# Patient Record
Sex: MALE
[De-identification: names, ages, dates, MRNs, and addresses within clinical notes are randomized per-mention and may not be internally consistent; named-entity substitution may affect disease eponyms.]

---

## 2018-07-31 ENCOUNTER — HOSPITAL ENCOUNTER (EMERGENCY)
Dept: HOSPITAL 31 - C.ER | Age: 23
Discharge: HOME | End: 2018-07-31
Payer: COMMERCIAL

## 2018-07-31 VITALS
RESPIRATION RATE: 20 BRPM | DIASTOLIC BLOOD PRESSURE: 71 MMHG | TEMPERATURE: 98.5 F | HEART RATE: 62 BPM | SYSTOLIC BLOOD PRESSURE: 116 MMHG

## 2018-07-31 VITALS — OXYGEN SATURATION: 98 %

## 2018-07-31 DIAGNOSIS — R42: Primary | ICD-10-CM

## 2018-07-31 LAB
ALBUMIN SERPL-MCNC: 4.4 G/DL (ref 3.5–5)
ALBUMIN/GLOB SERPL: 1.3 {RATIO} (ref 1–2.1)
ALT SERPL-CCNC: 38 U/L (ref 21–72)
AST SERPL-CCNC: 26 U/L (ref 17–59)
BASOPHILS # BLD AUTO: 0 K/UL (ref 0–0.2)
BASOPHILS NFR BLD: 0.8 % (ref 0–2)
BUN SERPL-MCNC: 14 MG/DL (ref 9–20)
CALCIUM SERPL-MCNC: 9.2 MG/DL (ref 8.6–10.4)
EOSINOPHIL # BLD AUTO: 0.2 K/UL (ref 0–0.7)
EOSINOPHIL NFR BLD: 4.2 % (ref 0–4)
ERYTHROCYTE [DISTWIDTH] IN BLOOD BY AUTOMATED COUNT: 14.3 % (ref 11.5–14.5)
GFR NON-AFRICAN AMERICAN: > 60
HGB BLD-MCNC: 13.6 G/DL (ref 12–18)
LYMPHOCYTES # BLD AUTO: 1.7 K/UL (ref 1–4.3)
LYMPHOCYTES NFR BLD AUTO: 31.2 % (ref 20–40)
MCH RBC QN AUTO: 27.9 PG (ref 27–31)
MCHC RBC AUTO-ENTMCNC: 33 G/DL (ref 33–37)
MCV RBC AUTO: 84.5 FL (ref 80–94)
MONOCYTES # BLD: 0.7 K/UL (ref 0–0.8)
MONOCYTES NFR BLD: 13 % (ref 0–10)
NEUTROPHILS # BLD: 2.8 K/UL (ref 1.8–7)
NEUTROPHILS NFR BLD AUTO: 50.8 % (ref 50–75)
NRBC BLD AUTO-RTO: 0 % (ref 0–2)
PLATELET # BLD: 261 K/UL (ref 130–400)
PMV BLD AUTO: 9.4 FL (ref 7.2–11.7)
RBC # BLD AUTO: 4.89 MIL/UL (ref 4.4–5.9)
WBC # BLD AUTO: 5.5 K/UL (ref 4.8–10.8)

## 2018-07-31 PROCEDURE — 80053 COMPREHEN METABOLIC PANEL: CPT

## 2018-07-31 PROCEDURE — 84443 ASSAY THYROID STIM HORMONE: CPT

## 2018-07-31 PROCEDURE — 99285 EMERGENCY DEPT VISIT HI MDM: CPT

## 2018-07-31 PROCEDURE — 70450 CT HEAD/BRAIN W/O DYE: CPT

## 2018-07-31 PROCEDURE — 96361 HYDRATE IV INFUSION ADD-ON: CPT

## 2018-07-31 PROCEDURE — 85025 COMPLETE CBC W/AUTO DIFF WBC: CPT

## 2018-07-31 PROCEDURE — 96375 TX/PRO/DX INJ NEW DRUG ADDON: CPT

## 2018-07-31 PROCEDURE — 96374 THER/PROPH/DIAG INJ IV PUSH: CPT

## 2018-07-31 NOTE — CT
Date of service: 



07/31/2018



PROCEDURE:  CT HEAD WITHOUT CONTRAST.



HISTORY:

Headache, dizziness



COMPARISON:

None available.



TECHNIQUE:

Axial computed tomography images were obtained through the head/brain 

without intravenous contrast.  



Radiation dose:



Total exam DLP = 1170.66 mGy-cm.



This CT exam was performed using one or more of the following dose 

reduction techniques: Automated exposure control, adjustment of the 

mA and/or kV according to patient size, and/or use of iterative 

reconstruction technique.



FINDINGS:



HEMORRHAGE:

No intracranial hemorrhage. 



BRAIN:

No mass effect or edema.  No atrophy or chronic microvascular 

ischemic changes.



VENTRICLES:

Unremarkable. No hydrocephalus. 



CALVARIUM:

Unremarkable.



PARANASAL SINUSES:

Unremarkable as visualized. No significant inflammatory changes.



MASTOID AIR CELLS:

Unremarkable as visualized. No inflammatory changes.



OTHER FINDINGS:

None.



IMPRESSION:

Normal CT of the Head.

## 2018-07-31 NOTE — C.PDOC
History Of Present Illness


24yo male, no past medical history, comes to ER with complaints of a frontal 

headache x 2 weeks. He reports associated nausea, vomiting and lightheadedness 

and states he has not had such episodes before. Patient denies any diarrhea, 

fever, rash, numbness, weakness, vision changes, shortness of breath or chest 

pain. 


Time Seen by Provider: 07/31/18 12:22


Chief Complaint (Nursing): Headache


History Per: Patient


History/Exam Limitations: no limitations


Onset/Duration Of Symptoms: Days, Persistent


Current Symptoms Are (Timing): Still Present


Pain Scale Rating Of: 7


Quality: "Pain"


Preceeding Symptoms: None


Recent travel outside of the [place]: No


Additional History Per: Patient





Past Medical History


Reviewed: Historical Data, Nursing Documentation, Vital Signs


Vital Signs: 


 Last Vital Signs











Temp  98.5 F   07/31/18 15:57


 


Pulse  62   07/31/18 15:57


 


Resp  20   07/31/18 15:57


 


BP  116/71   07/31/18 15:57


 


Pulse Ox  98   07/31/18 15:57














- Medical History


PMH: No Chronic Diseases


Surgical History: No Surg Hx


Family History: States: No Known Family Hx





- Social History


Hx Alcohol Use: Yes


Hx Substance Use: No





- Immunization History


Hx Tetanus Toxoid Vaccination: No


Hx Influenza Vaccination: No


Hx Pneumococcal Vaccination: No





Review Of Systems


Except As Marked, All Systems Reviewed And Found Negative.


Constitutional: Negative for: Fever, Chills


Cardiovascular: Positive for: Light Headedness.  Negative for: Chest Pain


Respiratory: Negative for: Shortness of Breath


Gastrointestinal: Positive for: Nausea, Vomiting.  Negative for: Abdominal Pain


Neurological: Positive for: Headache.  Negative for: Weakness, Numbness, 

Dizziness





Physical Exam





- Physical Exam


Appears: Non-toxic, No Acute Distress


Skin: Normal Color, Warm, Dry, No Rash


Head: Atraumatic, Normacephalic


Eye(s): bilateral: Normal Inspection, PERRL, EOMI


Nose: Normal


Oral Mucosa: Moist


Neck: Normal ROM, Supple


Chest: Symmetrical


Cardiovascular: Rhythm Regular


Respiratory: Normal Breath Sounds


Gastrointestinal/Abdominal: Normal Exam, Soft, No Tenderness


Back: Normal Inspection


Extremity: Normal ROM, No Pedal Edema, No Deformity, No Swelling


Neurological/Psych: Oriented x3, Normal Motor


Gait: Steady





ED Course And Treatment





- Laboratory Results


Result Diagrams: 


 07/31/18 12:56





 07/31/18 12:56


O2 Sat by Pulse Oximetry: 98 (RA)


Pulse Ox Interpretation: Normal


Progress Note: Labs, and CT head ordered. Patient given IV Fluids, reglan and 

benadryl





Medical Decision Making


Medical Decision Making: 


On re-exam, the patient reports improvement of symptoms. Lungs are CTA, heart 

is RRR, abdomen is soft, non-tender and tolerating PO well. Ambulatory in the 

ED with steady gait. Follow up with the medical doctor within 1-2 days, Return 

if worsened. 





Disposition





- Disposition


Referrals: 


Melquiades Basurto MD [Staff Provider] - 


Disposition: HOME/ ROUTINE


Disposition Time: 15:28


Condition: GOOD


Additional Instructions: 


Follow up with the medical doctor within 1-2 days, Return if worsened. 


Prescriptions: 


Ibuprofen [Motrin] 1 tab PO TID PRN #30 tab


 PRN Reason: Pain


Meclizine HCl 25 mg PO TID PRN #25 tablet


 PRN Reason: Dizziness


Instructions:  Vertigo (a Type of Dizziness)


Forms:  TreeRing (English)





- Clinical Impression


Clinical Impression: 


 Vertigo








- PA / NP / Resident Statement


MD/DO has reviewed & agrees with the documentation as recorded.





- Scribe Statement


The provider has reviewed the documentation as recorded by the Yana Del Rosario


Provider Attestation:


All medical record entries made by the Yana were at my direction and 

personally dictated by me. I have reviewed the chart and agree that the record 

accurately reflects my personal performance of the history, physical exam, 

medical decision making, and the department course for this patient. I have 

also personally directed, reviewed, and agree with the discharge instructions 

and disposition.